# Patient Record
Sex: FEMALE | Race: BLACK OR AFRICAN AMERICAN | NOT HISPANIC OR LATINO | URBAN - METROPOLITAN AREA
[De-identification: names, ages, dates, MRNs, and addresses within clinical notes are randomized per-mention and may not be internally consistent; named-entity substitution may affect disease eponyms.]

---

## 2019-11-20 ENCOUNTER — OFFICE VISIT (OUTPATIENT)
Dept: CARDIOLOGY | Facility: CLINIC | Age: 19
End: 2019-11-20

## 2019-11-20 VITALS
SYSTOLIC BLOOD PRESSURE: 126 MMHG | WEIGHT: 183 LBS | HEART RATE: 92 BPM | HEIGHT: 58 IN | DIASTOLIC BLOOD PRESSURE: 78 MMHG | BODY MASS INDEX: 38.41 KG/M2

## 2019-11-20 DIAGNOSIS — F32.A ANXIETY AND DEPRESSION: ICD-10-CM

## 2019-11-20 DIAGNOSIS — I10 HYPERTENSION, UNSPECIFIED TYPE: ICD-10-CM

## 2019-11-20 DIAGNOSIS — F41.9 ANXIETY AND DEPRESSION: ICD-10-CM

## 2019-11-20 DIAGNOSIS — F98.8 ATTENTION DEFICIT DISORDER, UNSPECIFIED HYPERACTIVITY PRESENCE: ICD-10-CM

## 2019-11-20 DIAGNOSIS — R07.2 PRECORDIAL PAIN: ICD-10-CM

## 2019-11-20 DIAGNOSIS — R00.0 TACHYCARDIA: Primary | ICD-10-CM

## 2019-11-20 PROCEDURE — 99204 OFFICE O/P NEW MOD 45 MIN: CPT | Performed by: INTERNAL MEDICINE

## 2019-11-20 RX ORDER — DULOXETIN HYDROCHLORIDE 30 MG/1
30 CAPSULE, DELAYED RELEASE ORAL DAILY
COMMUNITY

## 2019-11-20 NOTE — PROGRESS NOTES
[facility]        Subjective:     Encounter Date:11/20/2019      Patient ID: Rylee Stubbs is a 19 y.o. female.    Chief Complaint:  Chief Complaint   Patient presents with   • Rapid Heart Rate   Hypertension    HPI: I saw Rylee Stubbs today for cardiovascular evaluation.  She is a very pleasant 19-year-old female who reports the development of a rapid heart rate and hypertension approximately a month ago.  She has no known history of specific cardiac disease.  She is an active 19-year-old with no physical limitations.  She does have a history of attention deficit disorder, anxiety and depression.  She reports being placed on Concerta September 2019 for treatment of her ADHD.  She reports anxiety and depression related to family issues as her parents are going through divorce.  She was placed on Cymbalta about 2 weeks ago in addition to Concerta and at this point she reports a development of hypertension up to 158/98 and tachycardia.  EKG obtained at [facility] 11/11/2019 reveals sinus tachycardia at 126 beats a minute nonspecific ST-T wave changes.  She discontinued both Concerta and Cymbalta about a week ago and her symptoms have resolved.    During the period of taking Concerta and Cymbalta she reports some mid upper chest discomfort that she describes as a tightness or shooting sensation.  The discomfort would occur at rest or with exertion over last off and on for 30 minutes.  She states that she noticed this chest discomfort primarily with emotional stress.  There was some shortness of breath diaphoresis and nausea.  Now that she is discontinued both medications Cymbalta and Concerta her chest discomfort has resolved.  She denies any significant dyspnea on exertion nor does she experience orthopnea or PND or lower extremity edema.  She is free of syncope she does report occasional dizziness when she becomes anxious no significant palpitations.      The following portions  of the patient's history were reviewed and updated as appropriate: allergies, current medications, past family history, past medical history, past social history, past surgical history and problem list.    Problem List:  Patient Active Problem List   Diagnosis   • Anxiety   • Depression   • Attention deficit disorder (ADD)   • Endometriosis   • Tachycardia       Past Medical History:  Past Medical History:   Diagnosis Date   • Anemia    • Anxiety    • Attention deficit disorder (ADD)    • Depression    • Endometriosis    • Hypoglycemic disorder    • Tachycardia        Past Surgical History:  Past Surgical History:   Procedure Laterality Date   • LYMPH NODE DISSECTION      lymph node removed from neck       Social History:  Social History     Socioeconomic History   • Marital status: Single     Spouse name: Not on file   • Number of children: Not on file   • Years of education: Not on file   • Highest education level: Not on file   Tobacco Use   • Smoking status: Never Smoker   Substance and Sexual Activity   • Alcohol use: Yes   • Drug use: No       Allergies:  Allergies   Allergen Reactions   • Raspberry Hives     .         ROS:  Review of Systems   Constitution: Negative for weakness and malaise/fatigue.   HENT: Negative for hearing loss and nosebleeds.    Eyes: Negative for double vision and visual disturbance.   Cardiovascular: Positive for chest pain. Negative for claudication, dyspnea on exertion, near-syncope, orthopnea, paroxysmal nocturnal dyspnea and syncope.        Tachycardia   Respiratory: Negative for cough, shortness of breath, sleep disturbances due to breathing, snoring, sputum production and wheezing.    Endocrine: Negative for cold intolerance, heat intolerance, polydipsia and polyuria.   Hematologic/Lymphatic: Negative for adenopathy and bleeding problem. Does not bruise/bleed easily.   Skin: Negative for flushing and itching.   Musculoskeletal: Negative for back pain, falls, joint pain, joint  "swelling, muscle weakness and neck pain.   Gastrointestinal: Negative for abdominal pain, dysphagia, heartburn, nausea and vomiting.   Genitourinary: Negative for dysuria and frequency.   Neurological: Negative for disturbances in coordination, dizziness, light-headedness, loss of balance and numbness.   Psychiatric/Behavioral: Negative for altered mental status and memory loss. The patient is not nervous/anxious.    Allergic/Immunologic: Negative for hives and persistent infections.          Objective:         /78   Pulse 92   Ht 147.3 cm (58\")   Wt 83 kg (183 lb)   BMI 38.25 kg/m²      Physical Exam   Constitutional: She is oriented to person, place, and time. She appears well-developed and well-nourished.   HENT:   Head: Normocephalic and atraumatic.   Eyes: Conjunctivae and EOM are normal. Pupils are equal, round, and reactive to light.   Neck: Neck supple. No thyromegaly present.   Cardiovascular: Normal rate, regular rhythm, S1 normal, S2 normal, normal heart sounds and intact distal pulses. PMI is not displaced. Exam reveals no gallop, no S3, no S4, no distant heart sounds, no friction rub, no midsystolic click and no opening snap.   No murmur heard.  Pulses:       Carotid pulses are 2+ on the right side, and 2+ on the left side.       Radial pulses are 2+ on the right side, and 2+ on the left side.        Femoral pulses are 2+ on the right side, and 2+ on the left side.       Dorsalis pedis pulses are 2+ on the right side, and 2+ on the left side.        Posterior tibial pulses are 2+ on the right side, and 2+ on the left side.   Pulmonary/Chest: Effort normal and breath sounds normal. No respiratory distress. She has no wheezes. She has no rales.   Abdominal: Soft. Bowel sounds are normal. She exhibits no distension and no mass. There is no tenderness.   Musculoskeletal: Normal range of motion. She exhibits no edema.   Neurological: She is alert and oriented to person, place, and time.   Skin: " Skin is warm and dry. No erythema.   Psychiatric: She has a normal mood and affect.       In-Office Procedure(s):  Procedures    ASCVD RIsk Score::  The ASCVD Risk score (Mike JADE Jr., et al., 2013) failed to calculate for the following reasons:    The 2013 ASCVD risk score is only valid for ages 40 to 79    Recent Radiology:  Imaging Results (Most Recent)     None                Assessment/Plan:         1. Tachycardia  Associated with Concerta and Cymbalta now resolved    2. Precordial pain  Resolved    3. Hypertension, unspecified type  Controlled    4. Anxiety and depression      5. Attention deficit disorder, unspecified hyperactivity presence      I feel Rylee overall is stable from a cardiovascular standpoint.  She currently does not have chest pain pressure tightness or volume excess.  Her blood pressures controlled him her heart rate is within normal limits.  Temporally her hypertension and tachycardia related to taking combination of Cymbalta and Concerta.  She reports minimal caffeine intake and is relatively active.  I will obtain an echocardiogram see her in follow-up in 2 months.           Chetan Strauss MD  11/20/19  .

## 2025-07-14 ENCOUNTER — APPOINTMENT (OUTPATIENT)
Dept: WOMENS IMAGING | Facility: HOSPITAL | Age: 25
End: 2025-07-14
Payer: COMMERCIAL

## 2025-07-14 PROCEDURE — 76642 ULTRASOUND BREAST LIMITED: CPT | Performed by: RADIOLOGY
